# Patient Record
Sex: MALE | Race: WHITE | NOT HISPANIC OR LATINO | Employment: FULL TIME | URBAN - METROPOLITAN AREA
[De-identification: names, ages, dates, MRNs, and addresses within clinical notes are randomized per-mention and may not be internally consistent; named-entity substitution may affect disease eponyms.]

---

## 2024-05-01 ENCOUNTER — OFFICE VISIT (OUTPATIENT)
Dept: PAIN MEDICINE | Facility: CLINIC | Age: 64
End: 2024-05-01
Payer: COMMERCIAL

## 2024-05-01 VITALS
SYSTOLIC BLOOD PRESSURE: 138 MMHG | BODY MASS INDEX: 31.34 KG/M2 | WEIGHT: 195 LBS | RESPIRATION RATE: 17 BRPM | DIASTOLIC BLOOD PRESSURE: 82 MMHG | HEART RATE: 80 BPM | HEIGHT: 66 IN

## 2024-05-01 DIAGNOSIS — M54.16 LUMBAR RADICULOPATHY: ICD-10-CM

## 2024-05-01 PROCEDURE — 1125F AMNT PAIN NOTED PAIN PRSNT: CPT | Performed by: ANESTHESIOLOGY

## 2024-05-01 PROCEDURE — 1159F MED LIST DOCD IN RCRD: CPT | Performed by: ANESTHESIOLOGY

## 2024-05-01 PROCEDURE — 1036F TOBACCO NON-USER: CPT | Performed by: ANESTHESIOLOGY

## 2024-05-01 PROCEDURE — 99213 OFFICE O/P EST LOW 20 MIN: CPT | Performed by: ANESTHESIOLOGY

## 2024-05-01 RX ORDER — VALSARTAN 160 MG/1
TABLET ORAL
COMMUNITY
Start: 2021-07-09

## 2024-05-01 RX ORDER — PRAVASTATIN SODIUM 40 MG/1
TABLET ORAL
COMMUNITY
Start: 2022-02-14

## 2024-05-01 RX ORDER — LANOLIN ALCOHOL/MO/W.PET/CERES
CREAM (GRAM) TOPICAL
COMMUNITY

## 2024-05-01 ASSESSMENT — PAIN DESCRIPTION - DESCRIPTORS: DESCRIPTORS: SHARP;TIGHTNESS

## 2024-05-01 ASSESSMENT — PAIN SCALES - GENERAL
PAINLEVEL_OUTOF10: 1
PAINLEVEL: 1

## 2024-05-01 ASSESSMENT — PAIN - FUNCTIONAL ASSESSMENT: PAIN_FUNCTIONAL_ASSESSMENT: 0-10

## 2024-05-01 NOTE — PROGRESS NOTES
History Of Present Illness  Jaya Hodges is a 63 y.o. male presenting with low back pain radiating to the anterior aspect of right lower extremity.  Patient scribes pain as sharp shooting in nature not associated with any numbness or weakness in the legs.  Patient reports that his pain occurs after golfing.  Patient had manipulation by chiropractor that relieved his pain significantly.  MRI lumbosacral spine done 2021 that showed moderate canal stenosis at L3-L4 and L4-L5 with bilateral neuroforaminal stenosis right greater than left.  Patient underwent right SI joint injection that provided him more than 80% relief in 2021.  Patient is not currently taking any pain medications.  Patient is doing home strengthening core exercises and stretching.     Past Medical History  He has no past medical history on file.    Surgical History  He has no past surgical history on file.     Social History  He reports that he has never smoked. He has never used smokeless tobacco. He reports that he does not drink alcohol and does not use drugs.    Family History  No family history on file.     Allergies  Patient has no known allergies.    Review of systems:   13-point review of systems done and negative except as noted in HPI      Physical Exam   Vital signs: Reviewed  Constitutional: No acute distress, well appearing and well nourished. Patient appears stated age.   Eyes: Conjunctiva non-icteric and eye lids are without obvious rash or drooping. Pupils are symmetric.   Ears, Nose, Mouth, and Throat: External ears and nose appear to be without deformity or rash. No lesions or masses noted. Hearing is grossly intact.   Neck:. No JVD noted, tracheal position is midline.   Head and Face: Examination of the head and face revealed no abnormalities.   Respiratory: No gasping or shortness of breath noted, no use of accessory muscles noted.   Cardiovascular: Examination for edema is normal.   GI: Abdomen nontender to palpation.    Skin: No rashes or open lesions/ulcers identified on skin.   Musk: Digits/nails show no clubbing or cyanosis. No asymmetry or masses noted of the musculature. Examination of the muscles/joints/bones show normal range of motion. Gait is grossly normal.  Able to walk on toes and heels.   Neurologic: Cranial nerves II-XII intact, motor strength 5/5 muscle strength of the lower extremities bilaterally and equal. 5/5 muscle strength of the upper extremities bilaterally and equal.   Reflexes: normal.   Sensation: Normal to touch and pinprick in lower extremities bilaterally  Provocative tests: Positive straight leg raise right.  Negative Diann right.       Last Recorded Vitals  /82   Pulse 80   Resp 17   Wt 88.5 kg (195 lb)     Relevant Results            Last OARRS Review: No data recorded    I have personally reviewed the OARRS report for Jaya Karissacleopatra I have considered the risks of abuse, dependence, addiction and diversion       Assessment/Plan   Diagnoses and all orders for this visit:  Lumbar radiculopathy  -     Transforaminal; Future      Plan  Patient is not interested in taking pain medications at the present time.  Schedule patient for right L3 and L4 transforaminal epidural steroid injection  Patient advised to continue home core strengthening exercises and stretching.         Franck Veras MD

## 2024-06-10 DIAGNOSIS — M54.16 LUMBAR RADICULOPATHY: ICD-10-CM

## 2024-06-12 ENCOUNTER — HOSPITAL ENCOUNTER (OUTPATIENT)
Dept: OPERATING ROOM | Facility: CLINIC | Age: 64
Discharge: HOME | End: 2024-06-12
Payer: COMMERCIAL

## 2024-06-12 VITALS
OXYGEN SATURATION: 96 % | TEMPERATURE: 98.6 F | HEART RATE: 83 BPM | WEIGHT: 193.78 LBS | SYSTOLIC BLOOD PRESSURE: 137 MMHG | BODY MASS INDEX: 31.14 KG/M2 | HEIGHT: 66 IN | RESPIRATION RATE: 16 BRPM | DIASTOLIC BLOOD PRESSURE: 68 MMHG

## 2024-06-12 DIAGNOSIS — M54.16 LUMBAR RADICULOPATHY: ICD-10-CM

## 2024-06-12 PROCEDURE — 3600000001 HC OR TIME - INITIAL BASE CHARGE - PROCEDURE LEVEL ONE

## 2024-06-12 PROCEDURE — 64483 NJX AA&/STRD TFRM EPI L/S 1: CPT | Performed by: ANESTHESIOLOGY

## 2024-06-12 PROCEDURE — 2500000004 HC RX 250 GENERAL PHARMACY W/ HCPCS (ALT 636 FOR OP/ED): Performed by: ANESTHESIOLOGY

## 2024-06-12 PROCEDURE — 2500000005 HC RX 250 GENERAL PHARMACY W/O HCPCS: Performed by: ANESTHESIOLOGY

## 2024-06-12 PROCEDURE — 7100000010 HC PHASE TWO TIME - EACH INCREMENTAL 1 MINUTE

## 2024-06-12 PROCEDURE — 3600000006 HC OR TIME - EACH INCREMENTAL 1 MINUTE - PROCEDURE LEVEL ONE

## 2024-06-12 PROCEDURE — 99152 MOD SED SAME PHYS/QHP 5/>YRS: CPT | Performed by: ANESTHESIOLOGY

## 2024-06-12 PROCEDURE — 7100000009 HC PHASE TWO TIME - INITIAL BASE CHARGE

## 2024-06-12 PROCEDURE — 2550000001 HC RX 255 CONTRASTS: Performed by: ANESTHESIOLOGY

## 2024-06-12 RX ORDER — LIDOCAINE HYDROCHLORIDE 5 MG/ML
INJECTION, SOLUTION INFILTRATION; INTRAVENOUS AS NEEDED
Status: COMPLETED | OUTPATIENT
Start: 2024-06-12 | End: 2024-06-12

## 2024-06-12 RX ORDER — LATANOPROST 50 UG/ML
1 SOLUTION/ DROPS OPHTHALMIC NIGHTLY
COMMUNITY

## 2024-06-12 RX ORDER — SODIUM BICARBONATE 42 MG/ML
INJECTION, SOLUTION INTRAVENOUS AS NEEDED
Status: COMPLETED | OUTPATIENT
Start: 2024-06-12 | End: 2024-06-12

## 2024-06-12 RX ORDER — MIDAZOLAM HYDROCHLORIDE 1 MG/ML
INJECTION, SOLUTION INTRAMUSCULAR; INTRAVENOUS AS NEEDED
Status: COMPLETED | OUTPATIENT
Start: 2024-06-12 | End: 2024-06-12

## 2024-06-12 RX ORDER — DEXAMETHASONE SODIUM PHOSPHATE 10 MG/ML
INJECTION INTRAMUSCULAR; INTRAVENOUS AS NEEDED
Status: COMPLETED | OUTPATIENT
Start: 2024-06-12 | End: 2024-06-12

## 2024-06-12 ASSESSMENT — COLUMBIA-SUICIDE SEVERITY RATING SCALE - C-SSRS
1. IN THE PAST MONTH, HAVE YOU WISHED YOU WERE DEAD OR WISHED YOU COULD GO TO SLEEP AND NOT WAKE UP?: NO
2. HAVE YOU ACTUALLY HAD ANY THOUGHTS OF KILLING YOURSELF?: NO
6. HAVE YOU EVER DONE ANYTHING, STARTED TO DO ANYTHING, OR PREPARED TO DO ANYTHING TO END YOUR LIFE?: NO

## 2024-06-12 ASSESSMENT — PAIN - FUNCTIONAL ASSESSMENT
PAIN_FUNCTIONAL_ASSESSMENT: 0-10

## 2024-06-12 ASSESSMENT — PAIN SCALES - GENERAL
PAINLEVEL_OUTOF10: 0 - NO PAIN

## 2024-06-12 NOTE — H&P
History Of Present Illness  Jaya Hodges is a 63 y.o. male presents for procedure state below. Endorses no changes in past medical history or medical health since last seen in clinic.     Past Medical History  He has no past medical history on file.    Surgical History  He has a past surgical history that includes Hernia repair.     Social History  He reports that he has never smoked. He has never used smokeless tobacco. He reports that he does not drink alcohol and does not use drugs.    Family History  No family history on file.     Allergies  Patient has no known allergies.    Review of Symptoms:   Constitutional: Negative for chills, diaphoresis or fever  HENT: Negative for neck swelling  Eyes:.  Negative for eye pain  Respiratory:.  Negative for cough, shortness of breath or wheezing    Cardiovascular:.  Negative for chest pain or palpitations  Gastrointestinal:.  Negative for abdominal pain, nausea and vomiting  Genitourinary:.  Negative for urgency  Musculoskeletal:  Positive for back pain. Positive for joint pain. Denies falls within the past 3 months.  Skin: Negative for wounds or itching   Neurological: Negative for dizziness, seizures, loss of consciousness and weakness  Endo/Heme/Allergies: Does not bruise/bleed easily  Psychiatric/Behavioral: Negative for depression. The patient does not appear anxious.       PHYSICAL EXAM  Vitals signs reviewed  Constitutional:       General: Not in acute distress     Appearance: Normal appearance. Not ill-appearing.  HENT:     Head: Normocephalic and atraumatic  Eyes:     Conjunctiva/sclera: Conjunctivae normal  Cardiovascular:     Rate and Rhythm: Normal rate and regular rhythm  Pulmonary:     Effort: No respiratory distress  Abdominal:     Palpations: Abdomen is soft  Musculoskeletal: TUCKER  Skin:     General: Skin is warm and dry  Neurological:     General: No focal deficit present  Psychiatric:         Mood and Affect: Mood normal         Behavior: Behavior  normal     Last Recorded Vitals  There were no vitals taken for this visit.    Relevant Results  Current Outpatient Medications   Medication Instructions    latanoprost (Xalatan) 0.005 % ophthalmic solution 1 drop, Left Eye, Nightly    magnesium oxide (Mag-Ox) 400 mg (241.3 mg magnesium) tablet take 1 tablet by mouth with food once daily    pravastatin (Pravachol) 40 mg tablet     valsartan (Diovan) 160 mg tablet          MR LUMBAR SPINE WO IV CONTRAST 12/16/2021    Narrative  MRN: [id number]  Patient Name: LIBBY HERNANDEZ    STUDY:  MRI L-SPINE WO;  12/16/2021 12:52 pm    INDICATION:  pain  M54.9: Back pain M54.16: Lumbar radicular pain. Low back pain  extending into left leg with stiffness.    COMPARISON:  Plain film examination 12/01/2021.    ACCESSION NUMBER(S):  [id number]    ORDERING CLINICIAN:  WAGNER TAEGUE    TECHNIQUE:  Multiplanar and multisequential MR images of the lumbar spine are  performed.    FINDINGS:  The lumbar AP alignment is within normal limits. There is slight  lumbar levo convexity. Rounded foci of increased T1 weighted T2  weighted signal are identified in the L1, L4, L5, S1, and S2  vertebral bodies compatible with vertebral hemangiomas. The lumbar  vertebral heights are maintained. There is no acute bone marrow  edema. Subcortical degenerative bone marrow signal is identified at  L4-5. There are multilevel discogenic degenerative changes of the  lower lumbar spine with disc desiccation from L3 through S1 and disc  space narrowing at L3-4 and L4-5.    The conus medullaris is of normal morphology and signal. The tip of  the conus descends to the L1 level.    Axial images are performed through the lumbar disc spaces from the  inferior endplate of T12 through S1.    The T12-L1 disc space level is unremarkable.    The L1-2 disc space level demonstrates minimal facet arthrosis though  is otherwise unremarkable.    The L2-3 disc space level demonstrates mild bilateral facet  arthrosis. There is  minimal bulging disc and mild disc encroachment  and narrowing at the caudal neural foramen. There is some flattening  of the anterior thecal sac with mild central canal narrowing.    The L3-4 disc space level demonstrates mild bilateral facet arthrosis  greater on the right and mild ligamentum flavum hypertrophy. There is  mild circumferential bulging disc with mass effect upon the anterior  thecal sac and moderate central canal narrowing. There is crowding of  intrathecal nerve roots. There is disc encroachment with moderate  narrowing at the caudal neural foramen bilaterally.    The L4-5 disc space level demonstrates moderate bilateral facet  arthrosis and ligamentum flavum hypertrophy. There is moderate  circumferential bulging disc with mass effect upon the anterior  thecal sac and moderate to severe central canal narrowing. There is  disc encroachment with moderate to severe bilateral neural foraminal  narrowing.    The L5-S1 disc space level demonstrates mild bilateral facet  arthrosis greater on the right. There is mild bulging disc with some  narrowing of the left lateral recess. There is no mass effect upon  the thecal sac or S1 nerve roots. There is no significant neural  foraminal stenosis.    Impression  Mild discogenic degenerative changes in the lower lumbar spine.    Multilevel bulging disc with hypertrophic facet arthrosis and  ligamentum flavum hypertrophy. These findings contribute to varying  degrees of central canal and neural foraminal stenosis. Stenosis is  moderate to severe at L4-5, moderate L3-4, and mild at L2-3.    Multilevel vertebral body hemangiomas. No acute osseous abnormality.     No image results found.       1. Lumbar radiculopathy  Transforaminal    Transforaminal    FL pain management    FL pain management           ASSESSMENT/PLAN  Jaya Hodges is a 63 y.o. male presents for a right L3 &L4 transforaminal epidural steroid injection    Our plan is as follows:  - Follow In  pain clinic  - Continue to participate in physical therapy as well as physician directed home exercises  - Continue pain medications as prescribed       Leonardo Banks MD  '

## 2024-06-12 NOTE — DISCHARGE INSTRUCTIONS
Post-injection instructions:    Your pain may not be gone immediately after the procedure--it usually takes the steroid 3-5 days to start working.   It may take several weeks for the medicine to reach its' full effect.   Pay attention to how much pain relief (what percentage compared to before the procedure) you get and for how long it lasts.   We will ask you for this at the follow up visit.     Activity: Avoid strenuous activity for 24 hours. After that return to your normal activity level.     Bandages: Remove tomorrow    Showering/Bathing: You may shower after bandage is removed     Follow up: With Dr. Veras over the phone in one week to discuss how you are doing  Call Summer to Schedule.   Summer's Phone:  [phone]    After hours, call the   ([phone]) and ask for the pain management answering service if you notice any of the below:     Call the doctor immediately: if you notice:    Excessive bleeding from procedure site (brisk bright red bleeding from the site or bleeding that soaks the bandages or does not stop)   Severe headache  Inability to walk, leg or arm weakness or numbness that is significantly worse after the procedure   Uncontrolled pain   Inability to control your bowels or bladder   Signs of infection: Fever above 101.5F, redness, swelling, pus or drainage from the site